# Patient Record
Sex: MALE | Race: OTHER | Employment: STUDENT | URBAN - NONMETROPOLITAN AREA
[De-identification: names, ages, dates, MRNs, and addresses within clinical notes are randomized per-mention and may not be internally consistent; named-entity substitution may affect disease eponyms.]

---

## 2017-11-17 ENCOUNTER — OFFICE VISIT (OUTPATIENT)
Dept: FAMILY MEDICINE CLINIC | Age: 22
End: 2017-11-17
Payer: COMMERCIAL

## 2017-11-17 VITALS
HEIGHT: 67 IN | SYSTOLIC BLOOD PRESSURE: 115 MMHG | WEIGHT: 168.4 LBS | DIASTOLIC BLOOD PRESSURE: 64 MMHG | RESPIRATION RATE: 15 BRPM | HEART RATE: 99 BPM | BODY MASS INDEX: 26.43 KG/M2

## 2017-11-17 DIAGNOSIS — Z13.220 SCREENING, LIPID: ICD-10-CM

## 2017-11-17 DIAGNOSIS — Z13.29 SCREENING FOR THYROID DISORDER: ICD-10-CM

## 2017-11-17 DIAGNOSIS — Z13.1 SCREENING FOR DIABETES MELLITUS: ICD-10-CM

## 2017-11-17 DIAGNOSIS — Z00.00 ANNUAL PHYSICAL EXAM: Primary | ICD-10-CM

## 2017-11-17 DIAGNOSIS — Z83.438 FAMILY HISTORY OF ELEVATED BLOOD LIPIDS: ICD-10-CM

## 2017-11-17 PROCEDURE — 99385 PREV VISIT NEW AGE 18-39: CPT | Performed by: NURSE PRACTITIONER

## 2017-11-17 ASSESSMENT — ENCOUNTER SYMPTOMS
EYE DISCHARGE: 0
RHINORRHEA: 0
ABDOMINAL PAIN: 0
SHORTNESS OF BREATH: 0
CONSTIPATION: 0
DIARRHEA: 0
CHEST TIGHTNESS: 0
COUGH: 0
VOMITING: 0

## 2017-11-17 ASSESSMENT — PATIENT HEALTH QUESTIONNAIRE - PHQ9
1. LITTLE INTEREST OR PLEASURE IN DOING THINGS: 0
SUM OF ALL RESPONSES TO PHQ9 QUESTIONS 1 & 2: 0
2. FEELING DOWN, DEPRESSED OR HOPELESS: 0
SUM OF ALL RESPONSES TO PHQ QUESTIONS 1-9: 0

## 2017-11-17 NOTE — PROGRESS NOTES
Goal of 150 min per week   - advised of routine eye and dental exams  Screening for thyroid disorder  -     TSH with Reflex; Future    Screening for diabetes mellitus  -     Comprehensive Metabolic Panel, Fasting; Future    Screening, lipid  -     Lipid Panel; Future    Family history of elevated blood lipids        Return in about 1 year (around 11/17/2018) for Anual exam.    Farhad Rose received counseling on the following healthy behaviors: nutrition and exercise    Patient given educational materials on Nutrition, Exercise and wellness        Discussed use, benefit, and side effects of prescribed medications. Barriers to medication compliance addressed. All patient questions answered. Pt voiced understanding.      Electronically signed by Meri Wright CNP on 11/17/2017 at 11:29 AM

## 2017-11-21 ENCOUNTER — NURSE ONLY (OUTPATIENT)
Dept: FAMILY MEDICINE CLINIC | Age: 22
End: 2017-11-21
Payer: COMMERCIAL

## 2017-11-21 DIAGNOSIS — Z13.220 SCREENING, LIPID: ICD-10-CM

## 2017-11-21 DIAGNOSIS — Z13.1 SCREENING FOR DIABETES MELLITUS: ICD-10-CM

## 2017-11-21 DIAGNOSIS — Z00.00 ANNUAL PHYSICAL EXAM: ICD-10-CM

## 2017-11-21 DIAGNOSIS — Z13.29 SCREENING FOR THYROID DISORDER: ICD-10-CM

## 2017-11-21 LAB
ALBUMIN SERPL-MCNC: 4.6 G/DL (ref 3.5–5.1)
ALP BLD-CCNC: 80 U/L (ref 38–126)
ALT SERPL-CCNC: 96 U/L (ref 11–66)
ANION GAP SERPL CALCULATED.3IONS-SCNC: 13 MEQ/L (ref 8–16)
AST SERPL-CCNC: 26 U/L (ref 5–40)
BASOPHILS # BLD: 0.6 %
BASOPHILS ABSOLUTE: 0 THOU/MM3 (ref 0–0.1)
BILIRUB SERPL-MCNC: 0.4 MG/DL (ref 0.3–1.2)
BUN BLDV-MCNC: 15 MG/DL (ref 7–22)
CALCIUM SERPL-MCNC: 9.3 MG/DL (ref 8.5–10.5)
CHLORIDE BLD-SCNC: 104 MEQ/L (ref 98–111)
CHOLESTEROL, TOTAL: 223 MG/DL (ref 100–199)
CO2: 28 MEQ/L (ref 23–33)
CREAT SERPL-MCNC: 0.8 MG/DL (ref 0.4–1.2)
EOSINOPHIL # BLD: 2.6 %
EOSINOPHILS ABSOLUTE: 0.2 THOU/MM3 (ref 0–0.4)
GFR SERPL CREATININE-BSD FRML MDRD: > 90 ML/MIN/1.73M2
GLUCOSE FASTING: 94 MG/DL (ref 70–108)
HCT VFR BLD CALC: 50.6 % (ref 42–52)
HDLC SERPL-MCNC: 49 MG/DL
HEMOGLOBIN: 16.7 GM/DL (ref 14–18)
LDL CHOLESTEROL CALCULATED: 137 MG/DL
LYMPHOCYTES # BLD: 36.2 %
LYMPHOCYTES ABSOLUTE: 2.8 THOU/MM3 (ref 1–4.8)
MCH RBC QN AUTO: 30 PG (ref 27–31)
MCHC RBC AUTO-ENTMCNC: 33 GM/DL (ref 33–37)
MCV RBC AUTO: 90.8 FL (ref 80–94)
MONOCYTES # BLD: 9 %
MONOCYTES ABSOLUTE: 0.7 THOU/MM3 (ref 0.4–1.3)
NUCLEATED RED BLOOD CELLS: 0 /100 WBC
PDW BLD-RTO: 13.8 % (ref 11.5–14.5)
PLATELET # BLD: 252 THOU/MM3 (ref 130–400)
PMV BLD AUTO: 10.3 MCM (ref 7.4–10.4)
POTASSIUM SERPL-SCNC: 3.9 MEQ/L (ref 3.5–5.2)
RBC # BLD: 5.57 MILL/MM3 (ref 4.7–6.1)
SEG NEUTROPHILS: 51.6 %
SEGMENTED NEUTROPHILS ABSOLUTE COUNT: 4 THOU/MM3 (ref 1.8–7.7)
SODIUM BLD-SCNC: 145 MEQ/L (ref 135–145)
TOTAL PROTEIN: 6.9 G/DL (ref 6.1–8)
TRIGL SERPL-MCNC: 187 MG/DL (ref 0–199)
TSH SERPL DL<=0.05 MIU/L-ACNC: 3.03 UIU/ML (ref 0.4–4.2)
WBC # BLD: 7.7 THOU/MM3 (ref 4.8–10.8)

## 2017-11-21 PROCEDURE — 36415 COLL VENOUS BLD VENIPUNCTURE: CPT | Performed by: NURSE PRACTITIONER

## 2021-01-12 ENCOUNTER — OFFICE VISIT (OUTPATIENT)
Dept: FAMILY MEDICINE CLINIC | Age: 26
End: 2021-01-12
Payer: COMMERCIAL

## 2021-01-12 VITALS
SYSTOLIC BLOOD PRESSURE: 103 MMHG | BODY MASS INDEX: 23.84 KG/M2 | WEIGHT: 152.2 LBS | TEMPERATURE: 96.9 F | DIASTOLIC BLOOD PRESSURE: 63 MMHG | HEART RATE: 68 BPM | OXYGEN SATURATION: 98 %

## 2021-01-12 DIAGNOSIS — H61.22 IMPACTED CERUMEN OF LEFT EAR: Primary | ICD-10-CM

## 2021-01-12 PROCEDURE — G8420 CALC BMI NORM PARAMETERS: HCPCS | Performed by: NURSE PRACTITIONER

## 2021-01-12 PROCEDURE — G8427 DOCREV CUR MEDS BY ELIG CLIN: HCPCS | Performed by: NURSE PRACTITIONER

## 2021-01-12 PROCEDURE — G8484 FLU IMMUNIZE NO ADMIN: HCPCS | Performed by: NURSE PRACTITIONER

## 2021-01-12 PROCEDURE — 1036F TOBACCO NON-USER: CPT | Performed by: NURSE PRACTITIONER

## 2021-01-12 PROCEDURE — 69210 REMOVE IMPACTED EAR WAX UNI: CPT | Performed by: NURSE PRACTITIONER

## 2021-01-12 PROCEDURE — 99213 OFFICE O/P EST LOW 20 MIN: CPT | Performed by: NURSE PRACTITIONER

## 2021-01-12 RX ORDER — AMOXICILLIN 875 MG/1
875 TABLET, COATED ORAL 2 TIMES DAILY
Qty: 20 TABLET | Refills: 0 | Status: CANCELLED | OUTPATIENT
Start: 2021-01-12 | End: 2021-01-22

## 2021-01-12 SDOH — ECONOMIC STABILITY: TRANSPORTATION INSECURITY
IN THE PAST 12 MONTHS, HAS THE LACK OF TRANSPORTATION KEPT YOU FROM MEDICAL APPOINTMENTS OR FROM GETTING MEDICATIONS?: NO

## 2021-01-12 SDOH — ECONOMIC STABILITY: FOOD INSECURITY: WITHIN THE PAST 12 MONTHS, THE FOOD YOU BOUGHT JUST DIDN'T LAST AND YOU DIDN'T HAVE MONEY TO GET MORE.: NEVER TRUE

## 2021-01-12 ASSESSMENT — ENCOUNTER SYMPTOMS
SINUS PAIN: 0
CONSTIPATION: 0
SHORTNESS OF BREATH: 0
ABDOMINAL PAIN: 0
DIARRHEA: 0
SINUS PRESSURE: 0
EYE DISCHARGE: 0
RHINORRHEA: 0
CHEST TIGHTNESS: 0
VOMITING: 0
COUGH: 0

## 2021-01-12 ASSESSMENT — PATIENT HEALTH QUESTIONNAIRE - PHQ9
SUM OF ALL RESPONSES TO PHQ QUESTIONS 1-9: 0
1. LITTLE INTEREST OR PLEASURE IN DOING THINGS: 0
2. FEELING DOWN, DEPRESSED OR HOPELESS: 0
SUM OF ALL RESPONSES TO PHQ QUESTIONS 1-9: 0

## 2021-01-12 NOTE — PATIENT INSTRUCTIONS
loss of hearing. Watch closely for changes in your health, and be sure to contact your doctor if:    · You have pain or reduced hearing after 1 week of home treatment.     · You have any new symptoms, such as nausea or balance problems. Where can you learn more? Go to [email or web address] and sign in to your [employer] account. Enter U129 in the artaculous box to learn more about \"Earwax Blockage: Care Instructions. \"     If you do not have an account, please click on the \"Sign Up Now\" link. Current as of: June 26, 2019               Content Version: 12.6  © 7598-7155 [employer]. Care instructions adapted under license by [facility] [place] ([facility]). If you have questions about a medical condition or this instruction, always ask your healthcare professional. [address] any warranty or liability for your use of this information.

## 2021-01-12 NOTE — PROGRESS NOTES
[address] [facility],[address] [facility]. [address]  Dept: [phone]  Dept Fax: [phone]  Shay Milks: [phone]  Shay Motts Fax: [phone]     Visit type: Established patient    Reason for Visit: Otitis Media (L ear x 2 days ago )      Assessment and Plan       1. Impacted cerumen of left ear    Lighted currette taken and scooped soft ear wax from left ear. Pt tolerated well. After procedure canal with no redness or irritation. TM normal  Discussed not using q- tips OTC and that he could get debrox or ear irrigation system and use weekly/ monthly  Return if symptoms worsen or fail to improve. Subjective       Left ear pain  Onset a couple of days ago  No other symptoms  Has not tired anything for pain  No drainage, cough or fever  No hx of ear infections       Review of Systems   Constitutional: Negative for activity change, appetite change and fever. HENT: Positive for ear pain. Negative for congestion, ear discharge, rhinorrhea, sinus pressure, sinus pain and sneezing. Eyes: Negative for discharge and visual disturbance. Respiratory: Negative for cough, chest tightness and shortness of breath. Cardiovascular: Negative for chest pain and palpitations. Gastrointestinal: Negative for abdominal pain, constipation, diarrhea and vomiting. Neurological: Negative for dizziness, weakness, numbness and headaches. No Known Allergies    No outpatient medications prior to visit. No facility-administered medications prior to visit. History reviewed. No pertinent past medical history. Social History     Tobacco Use    Smoking status: Never Smoker    Smokeless tobacco: Never Used   Substance Use Topics    Alcohol use: Yes        History reviewed. No pertinent surgical history.     Family History   Problem Relation Age of Onset    Other Father         hypothyroid    High Cholesterol Father        Objective       /63   Pulse 68 Temp 96.9 °F (36.1 °C) (Temporal)   Wt 152 lb 3.2 oz (69 kg)   SpO2 98%   BMI 23.84 kg/m²   Physical Exam  Constitutional:       General: He is not in acute distress. Appearance: He is well-developed. Interventions: He is not intubated. HENT:      Head: Normocephalic and atraumatic. Right Ear: Tympanic membrane, ear canal and external ear normal.      Left Ear: External ear normal. There is impacted cerumen. Eyes:      General: Lids are normal.      Conjunctiva/sclera: Conjunctivae normal.   Neck:      Musculoskeletal: Normal range of motion. Pulmonary:      Effort: No tachypnea, bradypnea, prolonged expiration, respiratory distress or retractions. He is not intubated. Skin:     Coloration: Skin is not pale. Findings: No erythema or rash. Neurological:      Mental Status: He is alert and oriented to person, place, and time. Psychiatric:         Attention and Perception: Attention and perception normal.         Mood and Affect: Mood and affect normal.         Speech: Speech normal.         Behavior: Behavior normal. Behavior is cooperative. Thought Content:  Thought content normal.         Cognition and Memory: Cognition and memory normal.         Judgment: Judgment normal.           Data Reviewed and Summarized       Labs:     Imaging/Testing:        CARLOS Love - CNP

## 2021-01-27 ENCOUNTER — TELEPHONE (OUTPATIENT)
Dept: FAMILY MEDICINE CLINIC | Age: 26
End: 2021-01-27

## 2021-01-28 ENCOUNTER — VIRTUAL VISIT (OUTPATIENT)
Dept: FAMILY MEDICINE CLINIC | Age: 26
End: 2021-01-28
Payer: COMMERCIAL

## 2021-01-28 DIAGNOSIS — J06.9 VIRAL URI: Primary | ICD-10-CM

## 2021-01-28 DIAGNOSIS — Z20.822 EXPOSURE TO COVID-19 VIRUS: ICD-10-CM

## 2021-01-28 PROCEDURE — G8427 DOCREV CUR MEDS BY ELIG CLIN: HCPCS | Performed by: NURSE PRACTITIONER

## 2021-01-28 PROCEDURE — 99213 OFFICE O/P EST LOW 20 MIN: CPT | Performed by: NURSE PRACTITIONER

## 2021-01-28 NOTE — PROGRESS NOTES
[address] [facility],[address] [facility], [facility]. [place] [address] [facility]  Dept: [phone]  Dept Fax: : [phone]  Loc Fax: [phone]     Renita Sepulveda is a 30 y.o. male who presents today for his medical conditions/complaintsas noted below. Chief Complaint   Patient presents with    Concern For COVID-19     positive exposure at home        HPI:      Headache and sore throat   Onset x 3 days  Had testing on 1/26 at [facility]   Mother and sister covid positive and was given [id number], patient wants it       Medications:  No current outpatient medications on file. The patienthas No Known Allergies. Past Medical History  Power  has no past medical history on file. Past Surgical History  The patient  has no past surgical history on file. Family History  This patient's family history includes High Cholesterol in his father; Other in his father. Social History  Power  reports that he has never smoked. He has never used smokeless tobacco. He reports current alcohol use. He reports that he does not use drugs. Health Maintenance  Health Maintenance Due   Topic Date Due    Hepatitis C screen  1993    Varicella vaccine (1 of 2 - 2-dose childhood series) 12/21/1994    HIV screen  12/21/2008    DTaP/Tdap/Td vaccine (1 - Tdap) 12/21/2012    Flu vaccine (1) 09/01/2020       Subjective:     Review of Systems   Constitutional: Negative for activity change, appetite change and fever. HENT: Positive for sore throat. Negative for congestion, ear pain and rhinorrhea. Eyes: Negative for discharge and visual disturbance. Respiratory: Negative for cough, chest tightness and shortness of breath. Cardiovascular: Negative for chest pain and palpitations. Gastrointestinal: Negative for abdominal pain, constipation, diarrhea and vomiting. Genitourinary: Negative for difficulty urinating and hematuria. Musculoskeletal: Negative for arthralgias and myalgias. Skin: Negative for rash. Neurological: Positive for headaches. Negative for dizziness, weakness and numbness. Psychiatric/Behavioral: The patient is not nervous/anxious. Objective: There were no vitals taken for this visit. Physical Exam  Constitutional:       General: He is not in acute distress. Appearance: He is well-developed. Interventions: He is not intubated. HENT:      Head: Normocephalic and atraumatic. Right Ear: External ear normal.      Left Ear: External ear normal.   Eyes:      General: Lids are normal.      Conjunctiva/sclera: Conjunctivae normal.   Neck:      Musculoskeletal: Normal range of motion. Pulmonary:      Effort: No tachypnea, bradypnea, prolonged expiration, respiratory distress or retractions. He is not intubated. Skin:     Coloration: Skin is not pale. Findings: No erythema or rash. Neurological:      Mental Status: He is alert and oriented to person, place, and time. Psychiatric:         Attention and Perception: Attention and perception normal.         Mood and Affect: Mood and affect normal.         Speech: Speech normal.         Behavior: Behavior normal. Behavior is cooperative. Thought Content: Thought content normal.         Cognition and Memory: Cognition and memory normal.         Judgment: Judgment normal.         Assessment/Plan:      Power was seen today for concern for covid-19. Diagnoses and all orders for this visit:    Viral URI    Exposure to COVID-19 virus    already got tested- waiting for results  Increase water intake  zpak not appropriate  Vit c, vit d, vit b 12, zinc and melatonin  Quarantine until results are back- 10 days from onset of symptoms if covid positive  14 day quarantine from day of exposure if results are negative  Rest      Return if symptoms worsen or fail to improve. Patient was seen today via Telehealth by agreement and consent in light of the current COVID-19 pandemic. I used the following Telehealth technology: Audio and video capabilities. This patient encounter is appropriate and reasonable under the circumstances given the patient's particular presentation at this time. The patient has been advised of the potential risks and limitations of this mode of treatment (including but not limited to the absence of in-person examination) and has agreed to be treated in a remote fashion in spite of them. Any and all of the patient's/patient's family's questions on this issue have been answered and I have made no promises or guarantees to the patient. The patient has also been advised to contact this office for worsening conditions or problems, and seek emergency medical treatment and/or call 911 if the patient deems either necessary. The patient stated that they are [place] in the state [place]. If the patient is a minor, permission has been obtained by the parent or guardian for the patient to receive medical care at this visit. Discussed use, benefit, andside effects of prescribed medications. Barriers to medication compliance addressed. All patient questions answered. Pt voiced understanding.      Electronically signedby CARLOS Penaloza CNP on 2/1/2021 at 3:09 PM

## 2021-02-01 ASSESSMENT — ENCOUNTER SYMPTOMS
CHEST TIGHTNESS: 0
ABDOMINAL PAIN: 0
EYE DISCHARGE: 0
SORE THROAT: 1
CONSTIPATION: 0
SHORTNESS OF BREATH: 0
RHINORRHEA: 0
VOMITING: 0
COUGH: 0
DIARRHEA: 0

## 2023-01-05 ENCOUNTER — OFFICE VISIT (OUTPATIENT)
Dept: FAMILY MEDICINE CLINIC | Age: 28
End: 2023-01-05
Payer: COMMERCIAL

## 2023-01-05 VITALS
TEMPERATURE: 98 F | RESPIRATION RATE: 16 BRPM | DIASTOLIC BLOOD PRESSURE: 62 MMHG | WEIGHT: 182.8 LBS | SYSTOLIC BLOOD PRESSURE: 118 MMHG | HEART RATE: 67 BPM | HEIGHT: 67 IN | BODY MASS INDEX: 28.69 KG/M2

## 2023-01-05 DIAGNOSIS — R42 DIZZINESS: ICD-10-CM

## 2023-01-05 DIAGNOSIS — R12 HEARTBURN: Primary | ICD-10-CM

## 2023-01-05 DIAGNOSIS — Z13.220 SCREENING, LIPID: ICD-10-CM

## 2023-01-05 DIAGNOSIS — Z11.59 NEED FOR HEPATITIS C SCREENING TEST: ICD-10-CM

## 2023-01-05 DIAGNOSIS — Z11.4 ENCOUNTER FOR SCREENING FOR HIV: ICD-10-CM

## 2023-01-05 PROCEDURE — 99214 OFFICE O/P EST MOD 30 MIN: CPT | Performed by: FAMILY MEDICINE

## 2023-01-05 RX ORDER — OMEPRAZOLE 40 MG/1
40 CAPSULE, DELAYED RELEASE ORAL
Qty: 30 CAPSULE | Refills: 1 | Status: SHIPPED | OUTPATIENT
Start: 2023-01-05

## 2023-01-05 SDOH — ECONOMIC STABILITY: FOOD INSECURITY: WITHIN THE PAST 12 MONTHS, THE FOOD YOU BOUGHT JUST DIDN'T LAST AND YOU DIDN'T HAVE MONEY TO GET MORE.: NEVER TRUE

## 2023-01-05 SDOH — ECONOMIC STABILITY: FOOD INSECURITY: WITHIN THE PAST 12 MONTHS, YOU WORRIED THAT YOUR FOOD WOULD RUN OUT BEFORE YOU GOT MONEY TO BUY MORE.: NEVER TRUE

## 2023-01-05 ASSESSMENT — PATIENT HEALTH QUESTIONNAIRE - PHQ9
2. FEELING DOWN, DEPRESSED OR HOPELESS: 0
SUM OF ALL RESPONSES TO PHQ QUESTIONS 1-9: 0
SUM OF ALL RESPONSES TO PHQ9 QUESTIONS 1 & 2: 0
SUM OF ALL RESPONSES TO PHQ QUESTIONS 1-9: 0
1. LITTLE INTEREST OR PLEASURE IN DOING THINGS: 0

## 2023-01-05 ASSESSMENT — ENCOUNTER SYMPTOMS
DIARRHEA: 0
EYE DISCHARGE: 0
BACK PAIN: 1
ABDOMINAL PAIN: 1
COUGH: 0
SORE THROAT: 0
CONSTIPATION: 0
EYE REDNESS: 0
SHORTNESS OF BREATH: 0
NAUSEA: 0
VOMITING: 0

## 2023-01-05 ASSESSMENT — SOCIAL DETERMINANTS OF HEALTH (SDOH): HOW HARD IS IT FOR YOU TO PAY FOR THE VERY BASICS LIKE FOOD, HOUSING, MEDICAL CARE, AND HEATING?: NOT HARD AT ALL

## 2023-01-05 NOTE — PROGRESS NOTES
[address] [facility] [address]  Dept: [phone]  Dept Fax: [phone]  Loc: [phone]      Kyler Bustamante is a 32 y.o. male who presents todayfor his medical conditions/complaints as noted below. Kyler Bustamante is c/o of Other (Patient has been experiencing epigastric pain for the past 6 months that he states is gradually getting worse.)      :     HPI    Getting stomach pain and heartburn for months. Here with mother. Mother and sister both had helicobacter pylori. Sometimes takes antiacid pill. Not sure which one. Looked this up and taking Tums. Interview conducted in a mix of [facility] [place] and [place]. Patient is bilingual.        Patient Active Problem List   Diagnosis    Family history of elevated blood lipids      Goals    None       The patient has No Known Allergies. Medical History  Power has no past medical history on file. Past SurgicalHistory  The patient  has no past surgical history on file. Family History  This patient's family history includes High Cholesterol in his father; Other in his father. Social History  Powre  reports that he has never smoked. He has never used smokeless tobacco. He reports current alcohol use. He reports that he does not use drugs. Medications    Current Outpatient Medications:     omeprazole (PRILOSEC) 40 MG delayed release capsule, Take 1 capsule by mouth every morning (before breakfast), Disp: 30 capsule, Rfl: 1    Subjective:      Review of Systems   Constitutional:  Negative for chills, fatigue, fever and unexpected weight change. HENT:  Negative for congestion, ear pain and sore throat. Eyes:  Negative for discharge and redness. Respiratory:  Negative for cough and shortness of breath. Cardiovascular:  Negative for chest pain and palpitations. Gastrointestinal:  Positive for abdominal pain.  Negative for constipation, diarrhea, nausea and vomiting. Musculoskeletal:  Positive for back pain (with sitting on a chair to work; mild; not taking anything for this). Negative for neck pain. No incontinence   Skin:  Negative for rash. Allergic/Immunologic: Negative for environmental allergies. Neurological:  Positive for dizziness (if long periods without eating). Negative for weakness, numbness and headaches. Psychiatric/Behavioral:  Negative for dysphoric mood and sleep disturbance. The patient is not nervous/anxious. Objective:     Vitals:    01/05/23 1140   BP: 118/62   Site: Left Upper Arm   Position: Sitting   Cuff Size: Medium Adult   Pulse: 67   Resp: 16   Temp: 98 °F (36.7 °C)   TempSrc: Temporal   Weight: 182 lb 12.8 oz (82.9 kg)   Height: 5' 7\" (1.702 m)       Physical Exam  Vitals reviewed. Constitutional:       General: He is not in acute distress. Appearance: He is well-developed. He is not ill-appearing or toxic-appearing. HENT:      Head: Normocephalic and atraumatic. Mouth/Throat:      Mouth: Mucous membranes are moist.      Pharynx: Oropharynx is clear. Eyes:      Conjunctiva/sclera: Conjunctivae normal.   Cardiovascular:      Rate and Rhythm: Normal rate and regular rhythm. Heart sounds: Normal heart sounds. No murmur heard. Pulmonary:      Effort: Pulmonary effort is normal. No respiratory distress. Breath sounds: Normal breath sounds. No stridor. No wheezing, rhonchi or rales. Abdominal:      General: Bowel sounds are normal. There is no distension. Palpations: Abdomen is soft. There is no mass. Tenderness: There is no abdominal tenderness. There is no guarding or rebound. Hernia: No hernia is present. Musculoskeletal:      Cervical back: Neck supple. Skin:     General: Skin is warm and dry. Comments: No obvious rash. Neurological:      Mental Status: He is alert. Comments: No obvious focal deficit.    Psychiatric:         Mood and Affect: Mood normal.         Behavior: Behavior normal.         Thought Content: Thought content normal.         Judgment: Judgment normal.   Reports intermittent epigastric pain but none on exam today. Lab Results   Component Value Date    WBC 7.7 11/21/2017    HGB 16.7 11/21/2017    HCT 50.6 11/21/2017     11/21/2017    CHOL 223 (H) 11/21/2017    TRIG 187 11/21/2017    HDL 49 11/21/2017    ALT 96 (H) 11/21/2017    AST 26 11/21/2017     11/21/2017    K 3.9 11/21/2017     11/21/2017    CREATININE 0.8 11/21/2017    BUN 15 11/21/2017    CO2 28 11/21/2017    TSH 3.030 11/21/2017    GLUF 94 11/21/2017       /Plan:   1. Heartburn  Chronic issue with exacerbation newly addressed. Given family history, history, and exam, I suspect dyspepsia related to h. Pylori. Checking labs. Will trial omeprazole but should wait until after stool specimen collection to start. Family wants to shop around for labs so order given. If cost is a big issue, could defer lipase, HIV, and Hep C and lipid panel to another date. Indications for prompt return and/or emergent presentation if worsening reviewed in detail. Re-check in 1 month.   - H. Pylori Antigen, Stool; Future  - CBC with Auto Differential; Future  - Comprehensive Metabolic Panel; Future  - Lipase; Future  - omeprazole (PRILOSEC) 40 MG delayed release capsule; Take 1 capsule by mouth every morning (before breakfast)  Dispense: 30 capsule; Refill: 1    2. Dizziness  Mild and only after not eating. Normal exam. Will follow. Checking labs. 3. Encounter for screening for HIV  Screen. - HIV Screen; Future    4. Need for hepatitis C screening test  Screen. - Hepatitis C Antibody; Future    5. Screening, lipid  Screen. - Lipid, Fasting; Future        Return in about 1 month (around 2/5/2023) for re-check heartburn . Orders Placed   Orders Placed This Encounter   Procedures    H.  Pylori Antigen, Stool     Standing Status:   Future     Standing Expiration Date:   1/5/2024    CBC with Auto Differential     Standing Status:   Future     Standing Expiration Date:   1/5/2024    Comprehensive Metabolic Panel     Standing Status:   Future     Standing Expiration Date:   1/5/2024    Lipid, Fasting     Standing Status:   Future     Standing Expiration Date:   1/5/2024    HIV Screen     Standing Status:   Future     Standing Expiration Date:   1/5/2024    Hepatitis C Antibody     Standing Status:   Future     Standing Expiration Date:   1/5/2024    Lipase     Standing Status:   Future     Standing Expiration Date:   1/5/2024       Prescriptions given/sent  Orders Placed This Encounter   Medications    omeprazole (PRILOSEC) 40 MG delayed release capsule     Sig: Take 1 capsule by mouth every morning (before breakfast)     Dispense:  30 capsule     Refill:  1       Patient given educational materials - see patient instructions. Discussed use, benefit, and side effects of recommended medications. All patient questions answered. Pt voiced understanding. Reviewed health maintenance. Encouraged tetanus shot. Will consider in follow-up.          Electronically signed by Talia Oconnell MD on 1/5/2023 at 5:41 PM

## 2023-01-09 LAB
ALBUMIN SERPL-MCNC: 4.9 G/DL
ALP BLD-CCNC: 74 U/L
ALT SERPL-CCNC: 124 U/L
ANION GAP SERPL CALCULATED.3IONS-SCNC: ABNORMAL MMOL/L
ANTIBODY: <0.1
AST SERPL-CCNC: 30 U/L
BASOPHILS ABSOLUTE: 0.1 /ΜL
BASOPHILS RELATIVE PERCENT: 1 %
BILIRUB SERPL-MCNC: 0.8 MG/DL (ref 0.1–1.4)
BUN BLDV-MCNC: 14 MG/DL
CALCIUM SERPL-MCNC: 9.6 MG/DL
CHLORIDE BLD-SCNC: 104 MMOL/L
CHOLESTEROL, TOTAL: 236 MG/DL
CHOLESTEROL/HDL RATIO: ABNORMAL
CO2: 23 MMOL/L
CREAT SERPL-MCNC: 0.9 MG/DL
EOSINOPHILS ABSOLUTE: 0.2 /ΜL
EOSINOPHILS RELATIVE PERCENT: 3 %
GFR CALCULATED: 119
GLUCOSE BLD-MCNC: 96 MG/DL
HCT VFR BLD CALC: 52.7 % (ref 41–53)
HDLC SERPL-MCNC: 48 MG/DL (ref 35–70)
HEMOGLOBIN: 17.1 G/DL (ref 13.5–17.5)
LDL CHOLESTEROL CALCULATED: 148 MG/DL (ref 0–160)
LIPASE: 23 UNITS/L
LYMPHOCYTES ABSOLUTE: 3.2 /ΜL
LYMPHOCYTES RELATIVE PERCENT: 42 %
MCH RBC QN AUTO: 28.9 PG
MCHC RBC AUTO-ENTMCNC: 32.4 G/DL
MCV RBC AUTO: 89 FL
MONOCYTES ABSOLUTE: 0.6 /ΜL
MONOCYTES RELATIVE PERCENT: 9 %
NEUTROPHILS ABSOLUTE: 3.2 /ΜL
NEUTROPHILS RELATIVE PERCENT: 44 %
NONHDLC SERPL-MCNC: ABNORMAL MG/DL
PDW BLD-RTO: 13.2 %
PLATELET # BLD: 312 K/ΜL
PMV BLD AUTO: ABNORMAL FL
POTASSIUM SERPL-SCNC: 4.2 MMOL/L
RBC # BLD: 5.91 10^6/ΜL
SODIUM BLD-SCNC: 142 MMOL/L
TOTAL PROTEIN: 6.9
TRIGL SERPL-MCNC: 220 MG/DL
VLDLC SERPL CALC-MCNC: 40 MG/DL
WBC # BLD: 7.3 10^3/ML

## 2023-01-19 DIAGNOSIS — R79.89 ELEVATED LIVER FUNCTION TESTS: Primary | ICD-10-CM

## 2023-01-26 ENCOUNTER — TELEPHONE (OUTPATIENT)
Dept: FAMILY MEDICINE CLINIC | Age: 28
End: 2023-01-26

## 2023-01-26 NOTE — TELEPHONE ENCOUNTER
Patient had liver ultrasound scheduled and had canceled it and states that he is not planning to reschedule.    Please advise

## 2023-01-27 NOTE — TELEPHONE ENCOUNTER
Called and chatted with patient. He reports issues dealing with out billing department over getting the ultrasound scheduled. He reportedly spent hours on the phone, was charged $996 on his credit card prior to even having the test done, and even though he was on a joint call with his insurance company and [facility] ([facility]) billing department did not get calls back or have satisfactory resolution of this issue. He ended up getting an order from another physician for the ultrasound and has scheduled this to be done at [facility]. He is still planning to get his labs done and follow-up with me. Please confirm that Rudy Balderrama sees this message and that confirm that his insurance is in network and is appropriately in our system and that any charges that were not run through insurance are refunded. Thank you.

## 2023-01-28 LAB
ALBUMIN SERPL-MCNC: 4.8 G/DL
ALP BLD-CCNC: 72 U/L
ALT SERPL-CCNC: 132 U/L
ANION GAP SERPL CALCULATED.3IONS-SCNC: ABNORMAL MMOL/L
ANTIBODY: NEGATIVE
AST SERPL-CCNC: 39 U/L
BILIRUB SERPL-MCNC: 0.9 MG/DL (ref 0.1–1.4)
BUN BLDV-MCNC: 11 MG/DL
CALCIUM SERPL-MCNC: 9.4 MG/DL
CHLORIDE BLD-SCNC: 102 MMOL/L
CO2: 23 MMOL/L
CREAT SERPL-MCNC: 0.91 MG/DL
EGFR: 117
GLUCOSE BLD-MCNC: 92 MG/DL
HIV AG/AB: NON REACTIVE
IRON: 143
POTASSIUM SERPL-SCNC: 4.1 MMOL/L
SODIUM BLD-SCNC: 140 MMOL/L
TOTAL IRON BINDING CAPACITY: 269
TOTAL PROTEIN: 7.1
TSH SERPL DL<=0.05 MIU/L-ACNC: 1.94 UIU/ML

## 2023-01-31 NOTE — TELEPHONE ENCOUNTER
Spoke with Joyce @ [facility] billing she states that as of 1-26 the credit card was refunded and it could take up to 30 days to process.

## 2023-02-06 DIAGNOSIS — R79.89 ELEVATED LIVER FUNCTION TESTS: ICD-10-CM

## 2023-02-09 ENCOUNTER — OFFICE VISIT (OUTPATIENT)
Dept: FAMILY MEDICINE CLINIC | Age: 28
End: 2023-02-09
Payer: COMMERCIAL

## 2023-02-09 VITALS
HEIGHT: 66 IN | BODY MASS INDEX: 28.93 KG/M2 | HEART RATE: 75 BPM | DIASTOLIC BLOOD PRESSURE: 60 MMHG | OXYGEN SATURATION: 97 % | WEIGHT: 180 LBS | SYSTOLIC BLOOD PRESSURE: 94 MMHG | RESPIRATION RATE: 16 BRPM

## 2023-02-09 DIAGNOSIS — Z23 NEED FOR TETANUS, DIPHTHERIA, AND ACELLULAR PERTUSSIS (TDAP) VACCINE: ICD-10-CM

## 2023-02-09 DIAGNOSIS — R12 HEARTBURN: Primary | ICD-10-CM

## 2023-02-09 DIAGNOSIS — K82.4 GALLBLADDER POLYP: ICD-10-CM

## 2023-02-09 DIAGNOSIS — K76.0 FATTY LIVER: ICD-10-CM

## 2023-02-09 DIAGNOSIS — R42 DIZZINESS: ICD-10-CM

## 2023-02-09 PROCEDURE — 90715 TDAP VACCINE 7 YRS/> IM: CPT | Performed by: FAMILY MEDICINE

## 2023-02-09 PROCEDURE — G8427 DOCREV CUR MEDS BY ELIG CLIN: HCPCS | Performed by: FAMILY MEDICINE

## 2023-02-09 PROCEDURE — 1036F TOBACCO NON-USER: CPT | Performed by: FAMILY MEDICINE

## 2023-02-09 PROCEDURE — G8482 FLU IMMUNIZE ORDER/ADMIN: HCPCS | Performed by: FAMILY MEDICINE

## 2023-02-09 PROCEDURE — G8419 CALC BMI OUT NRM PARAM NOF/U: HCPCS | Performed by: FAMILY MEDICINE

## 2023-02-09 PROCEDURE — 99214 OFFICE O/P EST MOD 30 MIN: CPT | Performed by: FAMILY MEDICINE

## 2023-02-09 PROCEDURE — 90471 IMMUNIZATION ADMIN: CPT | Performed by: FAMILY MEDICINE

## 2023-02-09 RX ORDER — OMEPRAZOLE 20 MG/1
20-40 CAPSULE, DELAYED RELEASE ORAL DAILY
Qty: 180 CAPSULE | Refills: 1 | Status: SHIPPED | OUTPATIENT
Start: 2023-02-09

## 2023-02-09 ASSESSMENT — ENCOUNTER SYMPTOMS
EYE DISCHARGE: 0
CONSTIPATION: 0
COUGH: 0
DIARRHEA: 0
VOMITING: 0
ABDOMINAL PAIN: 1
SORE THROAT: 0
SHORTNESS OF BREATH: 0
EYE REDNESS: 0
NAUSEA: 0

## 2023-02-09 NOTE — PROGRESS NOTES
[address] [address]  Dept: [phone]  Dept Fax: [phone]  Loc: [phone]      Vesta Shelton is a 32 y.o. male who presents todayfor his medical conditions/complaints as noted below. Vesta Shelton is c/o of 1 Month Follow-Up (Heartburn- still present- a bit better though)      : HPI    Here for follow-up. Mildly elevated LFTs. No alcohol or tylenol. History of cancer on mother's side of the family. Heartburn is better. Changed diet. Also started omeprazole. Dizziness is only related to missed meals. Patient Active Problem List   Diagnosis    Family history of elevated blood lipids      Goals    None       The patient has No Known Allergies. Medical History  Power has no past medical history on file. Past SurgicalHistory  The patient  has no past surgical history on file. Family History  This patient's family history includes High Cholesterol in his father; Other in his father. Social History  Power  reports that he has never smoked. He has never used smokeless tobacco. He reports current alcohol use. He reports that he does not use drugs. Medications    Current Outpatient Medications:     omeprazole (PRILOSEC) 20 MG delayed release capsule, Take 1-2 capsules by mouth Daily Continue 40 mg for 2 months. Then attempt to taper to 20 mg if tolerated. , Disp: 180 capsule, Rfl: 1    omeprazole (PRILOSEC) 40 MG delayed release capsule, Take 1 capsule by mouth every morning (before breakfast), Disp: 30 capsule, Rfl: 1    Subjective:      Review of Systems   Constitutional:  Negative for chills, fatigue, fever and unexpected weight change. HENT:  Negative for congestion, ear pain and sore throat. Eyes:  Negative for discharge and redness. Respiratory:  Negative for cough and shortness of breath.     Cardiovascular:  Negative for chest pain and palpitations. Gastrointestinal:  Positive for abdominal pain (improved). Negative for constipation, diarrhea, nausea and vomiting. Musculoskeletal:  Negative for neck pain. No incontinence   Skin:  Negative for rash. Allergic/Immunologic: Negative for environmental allergies. Neurological:  Positive for dizziness (if long periods without eating). Negative for weakness, numbness and headaches. Psychiatric/Behavioral:  Negative for dysphoric mood and sleep disturbance. The patient is not nervous/anxious. Objective:     Vitals:    02/09/23 0926   BP: 94/60   Site: Left Upper Arm   Position: Sitting   Pulse: 75   Resp: 16   SpO2: 97%   Weight: 180 lb (81.6 kg)   Height: 5' 6\" (1.676 m)       Physical Exam  Vitals reviewed. Constitutional:       General: He is not in acute distress. Appearance: He is well-developed. He is not ill-appearing or toxic-appearing. HENT:      Head: Normocephalic and atraumatic. Eyes:      General: No scleral icterus. Right eye: No discharge. Left eye: No discharge. Conjunctiva/sclera: Conjunctivae normal.      Pupils: Pupils are equal, round, and reactive to light. Cardiovascular:      Rate and Rhythm: Normal rate and regular rhythm. Heart sounds: Normal heart sounds. Pulmonary:      Effort: Pulmonary effort is normal. No respiratory distress. Breath sounds: Normal breath sounds. Abdominal:      General: There is no distension. Palpations: Abdomen is soft. There is no mass. Tenderness: There is no abdominal tenderness. There is no guarding or rebound. Hernia: No hernia is present. Musculoskeletal:      Cervical back: Neck supple. Skin:     General: Skin is warm and dry. Comments: No obvious rash. Neurological:      Mental Status: He is alert. Comments: No obvious focal deficit.    Psychiatric:         Behavior: Behavior normal.       Lab Results   Component Value Date    WBC 7.3 01/09/2023 HGB 17.1 01/09/2023    HCT 52.7 01/09/2023     01/09/2023    CHOL 236 (H) 01/09/2023    TRIG 220 (H) 01/09/2023    HDL 48 01/09/2023     (H) 01/28/2023    AST 39 01/28/2023     01/28/2023    K 4.1 01/28/2023     01/28/2023    CREATININE 0.91 01/28/2023    BUN 11 01/28/2023    CO2 23 01/28/2023    TSH 1.940 01/28/2023    GLUF 94 11/21/2017       /Plan:   1. Heartburn  Improved chronic issue without complete symptoms resolution. Will continue 40 mg daily for 2 months then attempt taper. Fine to use Tums also. No red flags. Dicussed red flags that would prompt referral for upper scope; none are currently present. Indications for prompt return and/or emergent presentation if worsening reviewed in detail.      - omeprazole (PRILOSEC) 20 MG delayed release capsule; Take 1-2 capsules by mouth Daily Continue 40 mg for 2 months. Then attempt to taper to 20 mg if tolerated. Dispense: 180 capsule; Refill: 1    2. Dizziness  Mild. Stable. Only with fasting. Will follow without intervention at this time. 3. Fatty liver  Encouraged healthy diet and exercise. Will follow liver function testing. 4. Gallbladder polyp  Tiny, incidental, not concerning for malignancy. No action needed. 5. Need for tetanus, diphtheria, and acellular pertussis (Tdap) vaccine  Given.    - Tdap, BOOSTRIX, (age 8 yrs+), IM      Note reviewed all labs that were ordered between appointments on 1/28/2023 at this visit including Iron, TIBC, TSH, HIV, and H. Pylori screen. Return in about 4 months (around 6/9/2023). Orders Placed   Orders Placed This Encounter   Procedures    Tdap, BOOSTRIX, (age 8 yrs+), IM       Prescriptions given/sent  Orders Placed This Encounter   Medications    omeprazole (PRILOSEC) 20 MG delayed release capsule     Sig: Take 1-2 capsules by mouth Daily Continue 40 mg for 2 months. Then attempt to taper to 20 mg if tolerated.      Dispense:  180 capsule     Refill:  1       Patient given educational materials - see patient instructions. Discussed use, benefit, and side effects of recommended medications. All patient questions answered. Pt voiced understanding. Reviewed health maintenance; up to date except Boostrix which was given today.             Electronically signed by Carina Proctor MD on 2/9/2023 at 9:47 AM

## 2023-02-09 NOTE — PROGRESS NOTES
After obtaining consent, and per orders of Dr. Palmer Wesley, injection of Tdap given in Left deltoid by Osman Naidu MA. Patient instructed to remain in clinic for 20 minutes afterwards, and to report any adverse reaction to me immediately. Immunizations Administered       Name Date Dose Route    Tdap (Boostrix, Adacel) 2/9/2023 0.5 mL Intramuscular    Site: Deltoid- Left    Lot: [id number]    NDC: 87253-125-13          Vaccine checklist completed. VIS given.  Patient tolerated injection well

## 2023-06-22 LAB
ALBUMIN SERPL-MCNC: 4.6 G/DL
ALP BLD-CCNC: 81 U/L
ALT SERPL-CCNC: 97 U/L
ANION GAP SERPL CALCULATED.3IONS-SCNC: ABNORMAL MMOL/L
AST SERPL-CCNC: 30 U/L
BASOPHILS ABSOLUTE: 0 /ΜL
BASOPHILS RELATIVE PERCENT: 1 %
BILIRUB SERPL-MCNC: 0.5 MG/DL (ref 0.1–1.4)
BUN BLDV-MCNC: 14 MG/DL
CALCIUM SERPL-MCNC: 9.2 MG/DL
CHLORIDE BLD-SCNC: 102 MMOL/L
CO2: 21 MMOL/L
CREAT SERPL-MCNC: 0.92 MG/DL
EGFR: ABNORMAL
EOSINOPHILS ABSOLUTE: 0.1 /ΜL
EOSINOPHILS RELATIVE PERCENT: 2 %
GLUCOSE BLD-MCNC: 91 MG/DL
HCT VFR BLD CALC: 49 % (ref 41–53)
HEMOGLOBIN: 16.1 G/DL (ref 13.5–17.5)
LYMPHOCYTES ABSOLUTE: 2.9 /ΜL
LYMPHOCYTES RELATIVE PERCENT: 43 %
MCH RBC QN AUTO: 29 PG
MCHC RBC AUTO-ENTMCNC: 32.9 G/DL
MCV RBC AUTO: 88 FL
MONOCYTES ABSOLUTE: 0.6 /ΜL
MONOCYTES RELATIVE PERCENT: 9 %
NEUTROPHILS ABSOLUTE: 2.9 /ΜL
NEUTROPHILS RELATIVE PERCENT: 44 %
PDW BLD-RTO: 13.4 %
PLATELET # BLD: 289 K/ΜL
PMV BLD AUTO: NORMAL FL
POTASSIUM SERPL-SCNC: 3.9 MMOL/L
RBC # BLD: 5.55 10^6/ΜL
RPR TITER: NORMAL
RPR: NORMAL
SODIUM BLD-SCNC: 141 MMOL/L
TOTAL PROTEIN: 6.8
VITAMIN B-12: 597
VITAMIN D 25-HYDROXY: 19.7
VITAMIN D2, 25 HYDROXY: ABNORMAL
VITAMIN D3,25 HYDROXY: ABNORMAL
WBC # BLD: 6.6 10^3/ML

## 2023-07-05 DIAGNOSIS — R79.89 ELEVATED LFTS: ICD-10-CM

## 2023-07-05 DIAGNOSIS — R68.89 FORGETFULNESS: ICD-10-CM

## 2023-07-05 DIAGNOSIS — K76.0 FATTY LIVER: ICD-10-CM

## 2023-07-05 DIAGNOSIS — R53.83 OTHER FATIGUE: ICD-10-CM

## 2023-07-06 DIAGNOSIS — E55.9 VITAMIN D DEFICIENCY: Primary | ICD-10-CM

## 2023-07-06 RX ORDER — CHOLECALCIFEROL (VITAMIN D3) 125 MCG
1 CAPSULE ORAL DAILY
Qty: 30 CAPSULE | Refills: 0 | Status: SHIPPED | OUTPATIENT
Start: 2023-07-06

## 2023-07-07 ENCOUNTER — TELEPHONE (OUTPATIENT)
Dept: FAMILY MEDICINE CLINIC | Age: 28
End: 2023-07-07

## 2023-07-07 NOTE — TELEPHONE ENCOUNTER
----- Message from Thelma Singh DO sent at 7/6/2023 10:05 PM EDT -----  Please let patient know that labs showed:    1. Normal testosterone level, electrolytes, kidney function, thyroid function, blood counts, and vitamin B12 and folate levels. 2. Syphilis screening is negative. 3. Vitamin D is low. Recommend starting a daily vitamin D supplement. I will send prescription to pharmacy for patient to . 4. Liver function test is elevated but improved from last lab check which is good. Will need continued monitoring. Please let me know if patient has questions. PCP will be back in office next week as well and can address any concerns if needed.  Thanks    Gracie Ortiz DO

## 2023-07-11 ENCOUNTER — TELEPHONE (OUTPATIENT)
Dept: FAMILY MEDICINE CLINIC | Age: 28
End: 2023-07-11

## 2023-07-12 NOTE — TELEPHONE ENCOUNTER
Over-the-counter Selsun Blue apply once daily for 7 days keep it on for 10 minutes before showering and off use the pill once things should clear up in the next few weeks   Patient read [employer] message from . duplicate encounter

## 2023-12-04 ENCOUNTER — OFFICE VISIT (OUTPATIENT)
Dept: FAMILY MEDICINE CLINIC | Age: 28
End: 2023-12-04
Payer: COMMERCIAL

## 2023-12-04 VITALS
RESPIRATION RATE: 16 BRPM | DIASTOLIC BLOOD PRESSURE: 70 MMHG | SYSTOLIC BLOOD PRESSURE: 110 MMHG | WEIGHT: 182 LBS | HEART RATE: 88 BPM | OXYGEN SATURATION: 97 % | TEMPERATURE: 97.9 F | BODY MASS INDEX: 29.38 KG/M2

## 2023-12-04 DIAGNOSIS — J02.9 SORE THROAT: Primary | ICD-10-CM

## 2023-12-04 LAB
Lab: NORMAL
QC PASS/FAIL: NORMAL
SARS-COV-2 RDRP RESP QL NAA+PROBE: NEGATIVE
STREPTOCOCCUS A RNA: NEGATIVE

## 2023-12-04 PROCEDURE — 99213 OFFICE O/P EST LOW 20 MIN: CPT | Performed by: FAMILY MEDICINE

## 2023-12-04 PROCEDURE — 87651 STREP A DNA AMP PROBE: CPT | Performed by: FAMILY MEDICINE

## 2023-12-04 PROCEDURE — G8419 CALC BMI OUT NRM PARAM NOF/U: HCPCS | Performed by: FAMILY MEDICINE

## 2023-12-04 PROCEDURE — 1036F TOBACCO NON-USER: CPT | Performed by: FAMILY MEDICINE

## 2023-12-04 PROCEDURE — 87635 SARS-COV-2 COVID-19 AMP PRB: CPT | Performed by: FAMILY MEDICINE

## 2023-12-04 PROCEDURE — G8484 FLU IMMUNIZE NO ADMIN: HCPCS | Performed by: FAMILY MEDICINE

## 2023-12-04 PROCEDURE — G8427 DOCREV CUR MEDS BY ELIG CLIN: HCPCS | Performed by: FAMILY MEDICINE

## 2023-12-04 ASSESSMENT — ENCOUNTER SYMPTOMS
VOMITING: 0
SINUS PRESSURE: 1
NAUSEA: 1
DIARRHEA: 0
ABDOMINAL PAIN: 0
SHORTNESS OF BREATH: 1

## 2024-12-06 ENCOUNTER — HOSPITAL ENCOUNTER (OUTPATIENT)
Dept: ULTRASOUND IMAGING | Age: 29
Discharge: HOME | End: 2024-12-06
Attending: INTERNAL MEDICINE
Payer: COMMERCIAL

## 2024-12-06 DIAGNOSIS — R74.8 ELEVATED LIVER ENZYMES: ICD-10-CM

## 2024-12-06 PROCEDURE — 76705 ECHO EXAM OF ABDOMEN: CPT

## 2024-12-06 PROCEDURE — 93975 VASCULAR STUDY: CPT
